# Patient Record
Sex: MALE
[De-identification: names, ages, dates, MRNs, and addresses within clinical notes are randomized per-mention and may not be internally consistent; named-entity substitution may affect disease eponyms.]

---

## 2019-11-01 ENCOUNTER — APPOINTMENT (OUTPATIENT)
Dept: PHYSICAL MEDICINE AND REHAB | Facility: CLINIC | Age: 59
End: 2019-11-01

## 2019-11-01 PROBLEM — Z00.00 ENCOUNTER FOR PREVENTIVE HEALTH EXAMINATION: Status: ACTIVE | Noted: 2019-11-01

## 2019-11-05 ENCOUNTER — APPOINTMENT (OUTPATIENT)
Dept: PHYSICAL MEDICINE AND REHAB | Facility: CLINIC | Age: 59
End: 2019-11-05
Payer: MEDICARE

## 2019-11-05 VITALS — BODY MASS INDEX: 35.61 KG/M2 | HEIGHT: 68 IN | WEIGHT: 235 LBS

## 2019-11-05 DIAGNOSIS — Z78.9 OTHER SPECIFIED HEALTH STATUS: ICD-10-CM

## 2019-11-05 DIAGNOSIS — Z86.39 PERSONAL HISTORY OF OTHER ENDOCRINE, NUTRITIONAL AND METABOLIC DISEASE: ICD-10-CM

## 2019-11-05 DIAGNOSIS — Z86.718 PERSONAL HISTORY OF OTHER VENOUS THROMBOSIS AND EMBOLISM: ICD-10-CM

## 2019-11-05 DIAGNOSIS — Z85.118 PERSONAL HISTORY OF OTHER MALIGNANT NEOPLASM OF BRONCHUS AND LUNG: ICD-10-CM

## 2019-11-05 DIAGNOSIS — Z86.79 PERSONAL HISTORY OF OTHER DISEASES OF THE CIRCULATORY SYSTEM: ICD-10-CM

## 2019-11-05 DIAGNOSIS — Z80.9 FAMILY HISTORY OF MALIGNANT NEOPLASM, UNSPECIFIED: ICD-10-CM

## 2019-11-05 DIAGNOSIS — Z86.69 PERSONAL HISTORY OF OTHER DISEASES OF THE NERVOUS SYSTEM AND SENSE ORGANS: ICD-10-CM

## 2019-11-05 PROCEDURE — 99214 OFFICE O/P EST MOD 30 MIN: CPT | Mod: 25

## 2019-11-05 PROCEDURE — 20552 NJX 1/MLT TRIGGER POINT 1/2: CPT

## 2019-11-05 RX ORDER — ICOSAPENT ETHYL 500 MG/1
CAPSULE ORAL
Refills: 0 | Status: ACTIVE | COMMUNITY

## 2019-11-05 RX ORDER — TIZANIDINE HYDROCHLORIDE 4 MG/1
TABLET ORAL
Refills: 0 | Status: ACTIVE | COMMUNITY

## 2019-11-05 RX ORDER — LEVOTHYROXINE SODIUM 0.17 MG/1
TABLET ORAL
Refills: 0 | Status: ACTIVE | COMMUNITY

## 2019-11-05 RX ORDER — APIXABAN 5 MG/1
5 TABLET, FILM COATED ORAL
Refills: 0 | Status: ACTIVE | COMMUNITY

## 2019-11-05 NOTE — REVIEW OF SYSTEMS
[Abdominal Pain] : no abdominal pain [Nausea] : no nausea [Constipation] : no constipation [Diarrhea] : diarrhea [Joint Pain] : joint pain [Joint Stiffness] : no joint stiffness [Muscle Pain] : no muscle pain [Muscle Weakness] : no muscle weakness [Easy Bleeding] : a tendency for easy bleeding [Easy Bruising] : no tendency for easy bruising [Swollen Glands] : no swollen glands [Negative] : Psychiatric [FreeTextEntry7] : heartburn

## 2019-11-05 NOTE — PROCEDURE
[de-identified] : \par Trigger Point Tx\par After cleaning with alcohol, sterile needles were inserted into Du 14, and trapezius in multiple spots bilaterally and manipulated intermittently followed by injection of Lidocaine. The needles were then removed. Hemostasis was achieved immediately. He  tolerated the procedure well and was given discharge instructions and then discharged to home without any complaints or complications.\par \par

## 2019-11-05 NOTE — ASSESSMENT
[FreeTextEntry1] : Will request MRI cervical report and EMG from [facility].  \par \par Do not take Diclofenac or other nsaids since on Eliquis. \par \par F/u with oncologist Dr Chris Farah.  F/u with GI for reflux and diarrhea.

## 2019-11-05 NOTE — PHYSICAL EXAM
[FreeTextEntry1] : DOM is a 59 year  yr old male \par \par Constitutional: healthy appearing, NAD, and obese\par \par NECK\par ROM: flexion to 30, ext to 30, rotation to 60 deg bilat\par \par Inspection: no erythema, warmth\par Spine: no TTP in spinous process\par Soft tissue palpation: no TTP in cervical paraspinals, rhomboids, TTP in trapezius\par \par 5/5 bilateral elb flex/ext, WE, finger abd/flex\par sensation intact in bilat UE\par reflexes:  biceps and triceps 0 bilat\par \par Special tests: neg Spurling, Jj\par \par

## 2019-11-05 NOTE — HISTORY OF PRESENT ILLNESS
[FreeTextEntry1] : Location: neck \par Quality: sharp\par Severity: 6/10\par Duration: few months\par Timing: subacute\par Context: atraumatic\par Aggravating Factors: lying down\par Alleviating Factors: carina-garcia, diclofenac\par Associated Symptoms: denies weight loss, fever, chills, change in bowel/bladder habits, redness, warmth, weakness, numbness/tingling, radiation down arm\par Prior Studies: MRI\par

## 2019-12-12 ENCOUNTER — APPOINTMENT (OUTPATIENT)
Dept: PHYSICAL MEDICINE AND REHAB | Facility: CLINIC | Age: 59
End: 2019-12-12
Payer: MEDICARE

## 2019-12-12 ENCOUNTER — APPOINTMENT (OUTPATIENT)
Dept: ORTHOPEDIC SURGERY | Facility: CLINIC | Age: 59
End: 2019-12-12

## 2019-12-12 PROCEDURE — 20552 NJX 1/MLT TRIGGER POINT 1/2: CPT

## 2019-12-12 PROCEDURE — 99213 OFFICE O/P EST LOW 20 MIN: CPT | Mod: 25

## 2019-12-12 NOTE — PHYSICAL EXAM
[FreeTextEntry1] : DOM is a 59 year yr old male \par \par Constitutional: healthy appearing, NAD, and obese\par \par NECK\par ROM: flexion to 30, ext to 30, rotation to 60 deg bilat\par \par Inspection: no erythema, warmth\par Spine: no TTP in spinous process\par Soft tissue palpation: no TTP in cervical paraspinals, rhomboids, TTP in trapezius\par \par 5/5 bilateral elb flex/ext, WE, finger abd/flex\par sensation intact in bilat UE\par reflexes: biceps and triceps 0 bilat\par \par

## 2019-12-12 NOTE — HISTORY OF PRESENT ILLNESS
[FreeTextEntry1] : Location: neck \par Quality: sharp\par Severity: 6/10\par Duration: few months\par Timing: subacute\par Context: atraumatic\par Aggravating Factors: lying down\par Alleviating Factors: carina-garcia, diclofenac, TP tx\par Associated Symptoms: denies weight loss, fever, chills, change in bowel/bladder habits, redness, warmth, weakness, numbness/tingling, radiation down arm\par Prior Studies: MRI 9/2019

## 2019-12-12 NOTE — PROCEDURE
[de-identified] : \par Trigger Point Tx\par After cleaning with alcohol, sterile needles were inserted into Du 14, and trapezius bilaterally and manipulated intermittently followed by injection of Lidocaine. The needles were then removed. Hemostasis was achieved immediately. He  tolerated the procedure well and was given discharge instructions and then discharged to home without any complaints or complications.\par \par

## 2020-01-02 ENCOUNTER — APPOINTMENT (OUTPATIENT)
Dept: PHYSICAL MEDICINE AND REHAB | Facility: CLINIC | Age: 60
End: 2020-01-02
Payer: MEDICARE

## 2020-01-02 DIAGNOSIS — M50.20 OTHER CERVICAL DISC DISPLACEMENT, UNSPECIFIED CERVICAL REGION: ICD-10-CM

## 2020-01-02 PROCEDURE — 20552 NJX 1/MLT TRIGGER POINT 1/2: CPT

## 2020-01-02 PROCEDURE — 99214 OFFICE O/P EST MOD 30 MIN: CPT | Mod: 25

## 2020-01-02 NOTE — PROCEDURE
[de-identified] : \par Trigger Point Tx\par After cleaning with alcohol, sterile needles were inserted into Du 14 and right Ub 25 to 26, gluteus iwona,trapezius in multiple spots and manipulated intermittently followed by injection of Lidocaine. The needles were then removed. Hemostasis was achieved immediately. He  tolerated the procedure well and was given discharge instructions and then discharged to home without any complaints or complications.\par \par

## 2020-01-02 NOTE — HISTORY OF PRESENT ILLNESS
[FreeTextEntry1] : Location: neck \par Quality: sharp\par Severity: moderate, improving\par Duration: few months\par Timing: subacute\par Context: atraumatic\par Aggravating Factors: lying down\par Alleviating Factors: carina-garcia, diclofenac, TP tx, massage\par Associated Symptoms: denies weight loss, fever, chills, change in bowel/bladder habits, redness, warmth, weakness, numbness/tingling, radiation down arm\par Prior Studies: MRI 9/2019 \par \par Back hurting again lately.  Pain with bending.  No pain down legs, numbness. +Tingling in toes. \par

## 2020-01-02 NOTE — PHYSICAL EXAM
[FreeTextEntry1] : DOM is a 59 year yr old male \par \par Constitutional: healthy appearing, NAD, and obese\par \par NECK\par ROM: flexion to 30, ext to 30, rotation to 60 deg bilat\par \par Inspection: no erythema, warmth\par Spine: no TTP in spinous process\par Soft tissue palpation: no TTP in cervical paraspinals, rhomboids, TTP in trapezius\par \par 5/5 bilateral elb flex/ext, WE, finger abd/flex\par sensation intact in bilat UE\par reflexes: biceps and triceps 0 bilat\par \par BACK\par no swelling, erythema, warmth\par flexion to 30 deg, ext to 5 deg\par 5/5 bilat LE\par sensation intact in bilat LE

## 2020-01-17 ENCOUNTER — APPOINTMENT (OUTPATIENT)
Dept: PHYSICAL MEDICINE AND REHAB | Facility: CLINIC | Age: 60
End: 2020-01-17

## 2021-12-10 RX ORDER — GABAPENTIN 300 MG/1
300 CAPSULE ORAL DAILY
Qty: 60 | Refills: 0 | Status: ACTIVE | COMMUNITY
Start: 2021-12-10 | End: 1900-01-01

## 2021-12-14 ENCOUNTER — APPOINTMENT (OUTPATIENT)
Dept: PHYSICAL MEDICINE AND REHAB | Facility: CLINIC | Age: 61
End: 2021-12-14
Payer: MEDICARE

## 2021-12-14 PROCEDURE — 20552 NJX 1/MLT TRIGGER POINT 1/2: CPT

## 2021-12-14 PROCEDURE — 99214 OFFICE O/P EST MOD 30 MIN: CPT | Mod: 25

## 2021-12-14 PROCEDURE — 72100 X-RAY EXAM L-S SPINE 2/3 VWS: CPT

## 2021-12-15 NOTE — PROCEDURE
[de-identified] : Indication: myofascial pain\par \par After informed consent,he elected to proceed with a trigger point injection into the lumbar paraspinals. I confirmed no prior adverse reactions, no active infections, and no relevant allergies. \par  \par The skin was prepped in the usual sterile manner.  The sites were injected with Lidocaine followed by local needling. The injection was completed without complication and a bandage was applied. He tolerated the procedure well and was given post-injection instructions. \par  \par Cold Tx x 48 hours, analgesics prn. Medications: 0.5 ml of 1% Lidocaine per site\par \par \par \par Exp 5/2023\par Manufacture: [facility]\par NDC 3294-9977-00\par [id number]\par \par

## 2021-12-15 NOTE — ASSESSMENT
[FreeTextEntry1] : Discussed diagnosis and treatment plan including PT and losing weight.  Will need new MRI to do ESCOBAR.  Get clearance to stop eliquis from cardiologist.\par \par Not getting any more tx for cancer.  Oncologist is Dr Chris Farah at [facility].  \par \par Ice area often.  Educated not to take nsaids since on eliquis.\par \par I spent 30 min \par obtaining appropriate history and listening to the patient.  He spoke a lot about diarrhea and how it was initially thought to be from Metformin which they stopped but not he still has diarrhea.  On insulin.  Did not like endocrinologist so get a new one.\par performing a medically necessary, pertinent and appropriate examination\par discussing diagnosis, treatment options, and plan\par documenting visit\par called Dr Farah to get clearance - female fellow on call states he has not had tx for a while and is cleared for ESCOBAR.

## 2021-12-15 NOTE — PHYSICAL EXAM
[FreeTextEntry1] : DOM is a 61 year male \par Constitutional: healthy appearing, NAD, and normal body habitus\par \par LUMBAR\par ROM: flexion to\par \par Gait: normal\par \par Inspection: no erythema, warmth\par Spine: no TTP in spinous process\par Bony palpation: no TTP in GT\par \par Soft tissue palpation hip: no TTP in gluteus iwona\par Soft tissue palpation of spine: no TTP in lumbar paraspinals\par \par 5/5 bilateral KE, DF, PF \par sensation intact in bilat LE\par \par Special tests: neg seated SLR\par \par

## 2021-12-16 ENCOUNTER — TRANSCRIPTION ENCOUNTER (OUTPATIENT)
Age: 61
End: 2021-12-16

## 2021-12-22 ENCOUNTER — APPOINTMENT (OUTPATIENT)
Dept: PHYSICAL MEDICINE AND REHAB | Facility: CLINIC | Age: 61
End: 2021-12-22
Payer: MEDICARE

## 2021-12-22 PROCEDURE — 20552 NJX 1/MLT TRIGGER POINT 1/2: CPT

## 2021-12-22 PROCEDURE — 99213 OFFICE O/P EST LOW 20 MIN: CPT | Mod: 25

## 2021-12-22 RX ORDER — METOPROLOL SUCCINATE 50 MG/1
50 TABLET, EXTENDED RELEASE ORAL
Refills: 0 | Status: ACTIVE | COMMUNITY

## 2021-12-22 RX ORDER — INSULIN GLARGINE 100 [IU]/ML
100 INJECTION, SOLUTION SUBCUTANEOUS
Refills: 0 | Status: ACTIVE | COMMUNITY

## 2021-12-22 RX ORDER — SITAGLIPTIN 100 MG/1
100 TABLET, FILM COATED ORAL
Refills: 0 | Status: ACTIVE | COMMUNITY

## 2021-12-22 RX ORDER — AMLODIPINE BESYLATE 5 MG/1
5 TABLET ORAL
Refills: 0 | Status: ACTIVE | COMMUNITY

## 2021-12-22 RX ORDER — FUROSEMIDE 20 MG/1
20 TABLET ORAL
Refills: 0 | Status: ACTIVE | COMMUNITY

## 2021-12-22 RX ORDER — OMEPRAZOLE 20 MG/1
20 CAPSULE, DELAYED RELEASE ORAL
Refills: 0 | Status: DISCONTINUED | COMMUNITY
End: 2021-12-22

## 2021-12-22 RX ORDER — ROSUVASTATIN CALCIUM 20 MG/1
20 TABLET, FILM COATED ORAL
Refills: 0 | Status: ACTIVE | COMMUNITY

## 2021-12-22 RX ORDER — ACETAMINOPHEN AND CODEINE 300; 30 MG/1; MG/1
300-30 TABLET ORAL EVERY 8 HOURS
Qty: 14 | Refills: 0 | Status: ACTIVE | COMMUNITY
Start: 2021-12-14 | End: 1900-01-01

## 2021-12-22 RX ORDER — SITAGLIPTIN AND METFORMIN HYDROCHLORIDE 50; 1000 MG/1; MG/1
TABLET, FILM COATED ORAL
Refills: 0 | Status: DISCONTINUED | COMMUNITY
End: 2021-12-22

## 2021-12-22 RX ORDER — EMPAGLIFLOZIN 10 MG/1
10 TABLET, FILM COATED ORAL
Refills: 0 | Status: ACTIVE | COMMUNITY

## 2021-12-22 NOTE — PROCEDURE
[de-identified] : Indication: myofascial pain\par \par \par \par Trigger Point Tx\par After cleaning with alcohol, sterile needles were inserted into right Ub 25 to 26, gluteus iwona, and lateral hamstring and manipulated intermittently followed by injection of 0.1 ml of 1% Lidocaine. The needles were then removed. Hemostasis was achieved immediately. He  tolerated the procedure well and was given discharge instructions and then discharged to home without any complaints or complications.\par \par Exp 5/2023\par Manufacture: [employer]\par NDC 4734-7733-20\par [id number]\par

## 2021-12-22 NOTE — HISTORY OF PRESENT ILLNESS
[FreeTextEntry1] : Location: back\par Severity: 5/10\par Duration: over 3 yr\par Timing: chronic\par Aggravating Factors: bending, walking, moving\par Alleviating Factors: not much\par Associated Symptoms: denies weight loss, fever, chills, change in bowel/bladder habits, redness, warmth, weakness, numbness/tingling, +radiation down right thigh\par Prior Studies: MRI \par

## 2021-12-22 NOTE — ASSESSMENT
[FreeTextEntry1] : Discussed diagnosis and treatment plan including PT and losing weight. Getting MRI next wk to do ESCOBAR. \par Reviewed his message on fabi with his cardiologist saying he can stop eliquis 2 days.  Cardiologist is Dr Sánchez at [facility].\par \par Not getting any more tx for cancer. Oncologist is Dr Chris Farah at [facility]. \par \par Reminded to ice area often since on eliquis.\par \par Recommend getting spenco arch support.  He took picture on his phone.\par \par I spent 25 min \par obtaining appropriate history and listening to the patient\par performing a medically necessary, pertinent and appropriate examination\par discussing diagnosis, treatment options, and plan\par documenting visit\par update meds\par reviewed records showing he had 3 covid vaccines

## 2021-12-22 NOTE — PHYSICAL EXAM
[FreeTextEntry1] : DOM is a 61 year male \par Constitutional: healthy appearing, NAD, and overweight\par \par LUMBAR\par ROM: flexion to 30 deg, ext to 5 deg\par \par Gait: normal, pes planus\par \par Inspection: no erythema, warmth\par Spine: no TTP in spinous process\par Bony palpation: no TTP in GT\par \par Soft tissue palpation hip: no TTP in gluteus iwona\par Soft tissue palpation of spine: no TTP in lumbar paraspinals\par \par 5/5 bilateral KE, DF, PF \par sensation intact in bilat LE\par

## 2021-12-27 ENCOUNTER — TRANSCRIPTION ENCOUNTER (OUTPATIENT)
Age: 61
End: 2021-12-27

## 2021-12-27 DIAGNOSIS — K21.9 GASTRO-ESOPHAGEAL REFLUX DISEASE W/OUT ESOPHAGITIS: ICD-10-CM

## 2021-12-27 RX ORDER — PANTOPRAZOLE 40 MG/1
40 TABLET, DELAYED RELEASE ORAL DAILY
Qty: 30 | Refills: 0 | Status: ACTIVE | COMMUNITY
Start: 1900-01-01 | End: 1900-01-01

## 2021-12-28 ENCOUNTER — RX RENEWAL (OUTPATIENT)
Age: 61
End: 2021-12-28

## 2021-12-28 ENCOUNTER — APPOINTMENT (OUTPATIENT)
Dept: PHYSICAL MEDICINE AND REHAB | Facility: CLINIC | Age: 61
End: 2021-12-28
Payer: MEDICARE

## 2021-12-28 DIAGNOSIS — M54.16 RADICULOPATHY, LUMBAR REGION: ICD-10-CM

## 2021-12-28 PROCEDURE — 64484 NJX AA&/STRD TFRM EPI L/S EA: CPT | Mod: RT

## 2021-12-28 PROCEDURE — 64483 NJX AA&/STRD TFRM EPI L/S 1: CPT | Mod: RT

## 2022-01-04 ENCOUNTER — RX RENEWAL (OUTPATIENT)
Age: 62
End: 2022-01-04

## 2022-01-07 ENCOUNTER — APPOINTMENT (OUTPATIENT)
Dept: PHYSICAL MEDICINE AND REHAB | Facility: CLINIC | Age: 62
End: 2022-01-07

## 2022-01-07 ENCOUNTER — APPOINTMENT (OUTPATIENT)
Dept: PHYSICAL MEDICINE AND REHAB | Facility: CLINIC | Age: 62
End: 2022-01-07
Payer: MEDICARE

## 2022-01-07 PROCEDURE — 99213 OFFICE O/P EST LOW 20 MIN: CPT | Mod: 25

## 2022-01-07 PROCEDURE — 20552 NJX 1/MLT TRIGGER POINT 1/2: CPT

## 2022-01-07 NOTE — HISTORY OF PRESENT ILLNESS
[FreeTextEntry1] : Location: back\par Severity: 4/10\par Duration: over 3 yr\par Timing: chronic\par Aggravating Factors: bending, walking, moving\par Alleviating Factors: not much\par Associated Symptoms: denies weight loss, fever, chills, change in bowel/bladder habits, redness, warmth, weakness, +numbness/tingling in toes, +not much radiation down right thigh\par Prior Studies: MRI \par 12/2021 right L5 and S1 ESCOABR - significant relief, less leg pain

## 2022-01-07 NOTE — ASSESSMENT
[FreeTextEntry1] : Discussed diagnosis and treatment plan including PT.\par He will schedule more sessions.\par Reviewed exercises to do in detail including hamstring stretch, bridges, hip abd.\par \par Educated to lose weight and avoid back flexion.\par Weight himself once a wk.\par Ice area often. \par \par Not getting any more tx for cancer. Oncologist is Dr Chris Farah at [facility]. \par \par He ordered arch supports and they are on the way. \par \par I spent 23 min \par obtaining appropriate history and listening to the patient\par performing a medically necessary, pertinent and appropriate examination\par discussing diagnosis, treatment options, and plan\par documenting visit\par

## 2022-01-07 NOTE — PROCEDURE
[de-identified] : Indication: myofascial pain\par \par \par Trigger Point Tx\par After cleaning with alcohol, sterile needles were inserted into Ub 25 to 26, gluteus iwona and medius on right and manipulated intermittently followed by injection of 0.1 ml of 1% Lidocaine. The needles were then removed. Hemostasis was achieved immediately. He  tolerated the procedure well and was given discharge instructions and then discharged to home without any complaints or complications.\par \par Exp 5/2023\par Manufacture: [employer]\par NDC [id number]\par [id number]\par

## 2022-01-14 ENCOUNTER — APPOINTMENT (OUTPATIENT)
Dept: PHYSICAL MEDICINE AND REHAB | Facility: CLINIC | Age: 62
End: 2022-01-14
Payer: MEDICARE

## 2022-01-14 DIAGNOSIS — M51.26 OTHER INTERVERTEBRAL DISC DISPLACEMENT, LUMBAR REGION: ICD-10-CM

## 2022-01-14 DIAGNOSIS — M79.18 MYALGIA, OTHER SITE: ICD-10-CM

## 2022-01-14 PROCEDURE — 99213 OFFICE O/P EST LOW 20 MIN: CPT | Mod: 25

## 2022-01-14 PROCEDURE — 20552 NJX 1/MLT TRIGGER POINT 1/2: CPT

## 2022-01-14 NOTE — PHYSICAL EXAM
[FreeTextEntry1] : DOM is a 61 year male \par Constitutional: healthy appearing, NAD, and overweight\par \par LUMBAR\par ROM: flexion to 30 deg, ext to 5 deg\par \par Gait: normal, pes planus\par \par Inspection: no erythema, warmth\par Spine: no TTP in spinous process\par Bony palpation: no TTP in GT\par \par Soft tissue palpation hip: no TTP in gluteus iwona\par Soft tissue palpation of spine: no TTP in lumbar paraspinals\par \par sensation intact in bilat LE\par

## 2022-01-14 NOTE — HISTORY OF PRESENT ILLNESS
[FreeTextEntry1] : Location: back\par Severity: 4/10\par Duration: over 3 yr\par Timing: chronic\par Aggravating Factors: bending, walking, moving\par Alleviating Factors: not much\par Associated Symptoms: denies weight loss, fever, chills, change in bowel/bladder habits, redness, warmth, weakness, +numbness/tingling in toes, +not much radiation down right thigh\par Prior Studies: MRI \par 12/2021 right L5 and S1 ESCOBAR - significant relief, less leg pain \par  \par

## 2022-01-14 NOTE — PROCEDURE
[de-identified] : Indication: myofascial pain\par \par \par \par Trigger Point Tx\par After cleaning with alcohol, sterile needles were inserted into right Ub 25 to 26, and bilateral lower thoracic paraspinals and manipulated intermittently followed by injection of 0.1 ml of 1% Lidocaine. The needles were then removed. Hemostasis was achieved immediately. He  tolerated the procedure well and was given discharge instructions and then discharged to home without any complaints or complications.\par \par Exp 5/2023\par Manufacture: [employer]\par NDC 8280-6273-96\par [id number]\par

## 2022-01-14 NOTE — ASSESSMENT
[FreeTextEntry1] : Discussed diagnosis and treatment plan including PT.\par He wants to do it on his own for now.\par \par Educated to lose weight.\par Ice area often. \par \par Not getting any more tx for cancer. Oncologist is Dr Chris Farah at [facility]. \par \par He got arch supports.  Slowly increase walking distance or speed but not both at same time.\par \par I spent 21 min \par obtaining appropriate history and listening to the patient\par performing a medically necessary, pertinent and appropriate examination\par discussing diagnosis, treatment options, and plan\par documenting visit